# Patient Record
Sex: MALE | Race: WHITE | NOT HISPANIC OR LATINO
[De-identification: names, ages, dates, MRNs, and addresses within clinical notes are randomized per-mention and may not be internally consistent; named-entity substitution may affect disease eponyms.]

---

## 2017-03-06 ENCOUNTER — APPOINTMENT (OUTPATIENT)
Dept: DERMATOLOGY | Facility: CLINIC | Age: 47
End: 2017-03-06

## 2017-04-21 ENCOUNTER — EMERGENCY (EMERGENCY)
Facility: HOSPITAL | Age: 47
LOS: 1 days | Discharge: HOME | End: 2017-04-21
Attending: EMERGENCY MEDICINE | Admitting: EMERGENCY MEDICINE
Payer: MEDICAID

## 2017-04-21 VITALS
SYSTOLIC BLOOD PRESSURE: 162 MMHG | RESPIRATION RATE: 16 BRPM | HEART RATE: 74 BPM | DIASTOLIC BLOOD PRESSURE: 89 MMHG | OXYGEN SATURATION: 98 % | TEMPERATURE: 99 F

## 2017-04-21 PROCEDURE — 73130 X-RAY EXAM OF HAND: CPT | Mod: 26,RT

## 2017-04-21 PROCEDURE — 99284 EMERGENCY DEPT VISIT MOD MDM: CPT

## 2017-04-21 RX ORDER — IBUPROFEN 200 MG
600 TABLET ORAL ONCE
Qty: 0 | Refills: 0 | Status: COMPLETED | OUTPATIENT
Start: 2017-04-21 | End: 2017-04-21

## 2017-04-21 RX ADMIN — Medication 600 MILLIGRAM(S): at 21:22

## 2017-04-21 NOTE — ED PROVIDER NOTE - MEDICAL DECISION MAKING DETAILS
26 y/o M diabetic M w/ hematoma to R 2nd digit, questionable trauma, will check finger stick, obtain XR, pain control, reassess. 46 y/o diabetic M w/ hematoma to R 2nd digit, questionable trauma, will check finger stick, obtain XR, pain control, reassess. 48 y/o diabetic M w/ hematoma to R 2nd digit, questionable trauma, will check finger stick, obtain XR, pain control, reassess.  DC with abx given ?injury and mild surrounding erythema, concern for cellulitis/infection in diabetic.  Outpatinet pmd f/u 48hrs for wound check and hand follow-up.

## 2017-04-21 NOTE — ED PROVIDER NOTE - UPPER EXTREMITY EXAM, RIGHT
TENDERNESS/small x0.5cm diameter hematoma to the medial aspect of his distal R 2nd digit medial to the nail TTP, no jaesn deformity or TTP, pt has FROM at the DIP, PIP, and MCP, able to flex and extend, no other evidence of trauma to the hand TENDERNESS/small x0.5cm diameter hematoma/blood filled blister to the medial aspect of his distal R 2nd digit medial to the nail TTP, no jasen deformity or TTP, pt has FROM at the DIP, PIP, and MCP, able to flex and extend, no other evidence of trauma to the hand

## 2017-04-21 NOTE — ED PROVIDER NOTE - CARE PLAN
Principal Discharge DX:	Finger pain  Instructions for follow-up, activity and diet:	Take Clindamycin 300mg 4 times a day for 7 days. Take Probiotics to prevent GI upset.  up with your primary care physician for post hospital visit within 48 hours.  Follow up with hand specialist for further evaluation - list attached.  Return to the Emergency Department for any new, worsening or concerning symptoms.

## 2017-04-21 NOTE — ED PROVIDER NOTE - PROGRESS NOTE DETAILS
KERVIN Green: pt VANESA, VSS. Discussed the results of the imaging with the patient. Advised him to follow up with hand and PCP

## 2017-04-21 NOTE — ED PROVIDER NOTE - NS ED MD SCRIBE ATTENDING SCRIBE SECTIONS
VITAL SIGNS( Pullset)/HIV/HISTORY OF PRESENT ILLNESS/DISPOSITION/PAST MEDICAL/SURGICAL/SOCIAL HISTORY/PHYSICAL EXAM/REVIEW OF SYSTEMS

## 2017-04-21 NOTE — ED PROVIDER NOTE - OBJECTIVE STATEMENT
48 y/o M, w/ PMHx of DM Type 2(on Metformin), and HLD(on atorvastatin) presents to ED c/o worsening bump w/ pain to the R 2nd finger since this AM. States his finger was fine the night before however has h/o sleep walking. Endorses pain at the area of the bump. Notes difficulty bending his finger. Denies trauma, and other complaints. NKDA. 46 y/o M, w/ PMHx of DM Type 2(on Metformin), and HLD(on atorvastatin) presents to ED c/o worsening bump w/ pain to the R 2nd finger since this AM. States his finger was fine the night before however has h/o sleep walking and thinks he possibly injured it while sleeping. Endorses pain at the area of the bump, no other pain or trauma.  Denies fever, chills, rash, swelling, other complaints. NKDA.

## 2017-04-26 ENCOUNTER — LABORATORY RESULT (OUTPATIENT)
Age: 47
End: 2017-04-26

## 2017-04-26 ENCOUNTER — APPOINTMENT (OUTPATIENT)
Dept: DERMATOLOGY | Facility: CLINIC | Age: 47
End: 2017-04-26

## 2017-04-26 VITALS — DIASTOLIC BLOOD PRESSURE: 80 MMHG | HEIGHT: 72 IN | SYSTOLIC BLOOD PRESSURE: 128 MMHG

## 2017-04-26 DIAGNOSIS — Z86.39 PERSONAL HISTORY OF OTHER ENDOCRINE, NUTRITIONAL AND METABOLIC DISEASE: ICD-10-CM

## 2017-04-26 DIAGNOSIS — R23.8 OTHER SKIN CHANGES: ICD-10-CM

## 2017-04-26 DIAGNOSIS — Z78.9 OTHER SPECIFIED HEALTH STATUS: ICD-10-CM

## 2017-04-26 RX ORDER — MUPIROCIN 20 MG/G
2 OINTMENT TOPICAL
Qty: 1 | Refills: 1 | Status: ACTIVE | COMMUNITY
Start: 2017-04-26 | End: 1900-01-01

## 2017-04-27 ENCOUNTER — MEDICATION RENEWAL (OUTPATIENT)
Age: 47
End: 2017-04-27

## 2017-04-27 RX ORDER — DOXYCYCLINE HYCLATE 100 MG/1
100 CAPSULE ORAL
Qty: 1 | Refills: 0 | Status: DISCONTINUED | COMMUNITY
Start: 2017-04-26 | End: 2017-04-27

## 2017-04-27 RX ORDER — DOXYCYCLINE 100 MG/1
100 CAPSULE ORAL
Qty: 1 | Refills: 0 | Status: ACTIVE | COMMUNITY
Start: 2017-04-27 | End: 1900-01-01

## 2017-05-01 ENCOUNTER — MEDICATION RENEWAL (OUTPATIENT)
Age: 47
End: 2017-05-01

## 2017-05-01 DIAGNOSIS — A49.02 METHICILLIN RESISTANT STAPHYLOCOCCUS AUREUS INFECTION, UNSPECIFIED SITE: ICD-10-CM

## 2017-05-01 RX ORDER — CLINDAMYCIN HYDROCHLORIDE 300 MG/1
300 CAPSULE ORAL EVERY 6 HOURS
Qty: 40 | Refills: 0 | Status: ACTIVE | COMMUNITY
Start: 2017-05-01 | End: 1900-01-01

## 2017-05-08 ENCOUNTER — APPOINTMENT (OUTPATIENT)
Dept: DERMATOLOGY | Facility: CLINIC | Age: 47
End: 2017-05-08

## 2017-05-08 VITALS — DIASTOLIC BLOOD PRESSURE: 80 MMHG | SYSTOLIC BLOOD PRESSURE: 126 MMHG

## 2017-05-08 DIAGNOSIS — L03.90 CELLULITIS, UNSPECIFIED: ICD-10-CM

## 2017-05-08 DIAGNOSIS — L72.9 FOLLICULAR CYST OF THE SKIN AND SUBCUTANEOUS TISSUE, UNSPECIFIED: ICD-10-CM

## 2017-05-22 ENCOUNTER — APPOINTMENT (OUTPATIENT)
Dept: DERMATOLOGY | Facility: CLINIC | Age: 47
End: 2017-05-22

## 2018-07-25 ENCOUNTER — EMERGENCY (EMERGENCY)
Facility: HOSPITAL | Age: 48
LOS: 0 days | Discharge: HOME | End: 2018-07-26
Attending: EMERGENCY MEDICINE
Payer: MEDICAID

## 2018-07-25 VITALS
HEART RATE: 81 BPM | HEIGHT: 72 IN | WEIGHT: 235.01 LBS | RESPIRATION RATE: 16 BRPM | SYSTOLIC BLOOD PRESSURE: 155 MMHG | OXYGEN SATURATION: 97 % | TEMPERATURE: 99 F | DIASTOLIC BLOOD PRESSURE: 99 MMHG

## 2018-07-25 DIAGNOSIS — R07.81 PLEURODYNIA: ICD-10-CM

## 2018-07-25 DIAGNOSIS — E11.9 TYPE 2 DIABETES MELLITUS WITHOUT COMPLICATIONS: ICD-10-CM

## 2018-07-25 DIAGNOSIS — Z79.4 LONG TERM (CURRENT) USE OF INSULIN: ICD-10-CM

## 2018-07-25 DIAGNOSIS — E78.5 HYPERLIPIDEMIA, UNSPECIFIED: ICD-10-CM

## 2018-07-25 DIAGNOSIS — I10 ESSENTIAL (PRIMARY) HYPERTENSION: ICD-10-CM

## 2018-07-25 DIAGNOSIS — R52 PAIN, UNSPECIFIED: ICD-10-CM

## 2018-07-25 DIAGNOSIS — R58 HEMORRHAGE, NOT ELSEWHERE CLASSIFIED: ICD-10-CM

## 2018-07-25 DIAGNOSIS — F41.9 ANXIETY DISORDER, UNSPECIFIED: ICD-10-CM

## 2018-07-25 PROCEDURE — 99284 EMERGENCY DEPT VISIT MOD MDM: CPT

## 2018-07-25 NOTE — ED ADULT TRIAGE NOTE - CHIEF COMPLAINT QUOTE
Pt had a fall when visiting a store, he fell on his side and hit a chair. Pt has a noticeable bruise to the right underarm, pt has no SOB, but he does have pain. Pt has full ROM of the extremity, but states he has a little pain in it. Pt is also complaining of pain in his lower back. Denies chest pain, denies shortness of breath. Pt did not hit his head and has no LOC to report.

## 2018-07-25 NOTE — ED ADULT NURSE NOTE - CHPI ED SYMPTOMS NEG
no bladder dysfunction/no numbness/no bowel dysfunction/no fatigue/no constipation/no neck tenderness/no anorexia/no motor function loss/no tingling/no difficulty bearing weight

## 2018-07-25 NOTE — ED ADULT NURSE NOTE - CINV DISCH MEDS REVIEWED YN
Mildly to Moderately Impaired: difficulty hearing in some environments or speaker may need to increase volume or speak distinctly No

## 2018-07-26 VITALS — HEART RATE: 76 BPM | SYSTOLIC BLOOD PRESSURE: 160 MMHG | RESPIRATION RATE: 20 BRPM | DIASTOLIC BLOOD PRESSURE: 106 MMHG

## 2018-07-26 PROBLEM — E11.9 TYPE 2 DIABETES MELLITUS WITHOUT COMPLICATIONS: Chronic | Status: ACTIVE | Noted: 2017-04-21

## 2018-07-26 PROBLEM — E78.5 HYPERLIPIDEMIA, UNSPECIFIED: Chronic | Status: ACTIVE | Noted: 2017-04-21

## 2018-07-26 PROCEDURE — 71250 CT THORAX DX C-: CPT | Mod: 26

## 2018-07-26 PROCEDURE — 72100 X-RAY EXAM L-S SPINE 2/3 VWS: CPT | Mod: 26

## 2018-07-26 RX ORDER — METFORMIN HYDROCHLORIDE 850 MG/1
1 TABLET ORAL
Qty: 0 | Refills: 0 | COMMUNITY

## 2018-07-26 RX ORDER — IBUPROFEN 200 MG
1 TABLET ORAL
Qty: 20 | Refills: 0 | OUTPATIENT
Start: 2018-07-26 | End: 2018-07-30

## 2018-07-26 RX ORDER — ATORVASTATIN CALCIUM 80 MG/1
1 TABLET, FILM COATED ORAL
Qty: 0 | Refills: 0 | COMMUNITY

## 2018-07-26 RX ORDER — IBUPROFEN 200 MG
600 TABLET ORAL ONCE
Qty: 0 | Refills: 0 | Status: COMPLETED | OUTPATIENT
Start: 2018-07-26 | End: 2018-07-26

## 2018-07-26 RX ADMIN — Medication 600 MILLIGRAM(S): at 02:16

## 2018-07-26 RX ADMIN — Medication 600 MILLIGRAM(S): at 01:30

## 2018-07-26 NOTE — ED PROVIDER NOTE - MEDICAL DECISION MAKING DETAILS
Patient with slip and fall. VSS.  CT, xray findings negative for internal injuries, findings were discussed with patient.  Patient was examined head to toe and screened for additional injuries. Based on patient's history and physical exam, there are no apparent indications for further emergent labs, imaging, or additional diagnostics at this time. Discussed results and outcome of today's visit with the patient.  Patient advised to please follow up with another healthcare provider within the next 24 hours and return to the Emergency Department for worsening symptoms or any other concerns.  Patient advised that their doctor may call  to follow up on the specific results of the tests performed today in the emergency department.   Patient appears well on discharge.

## 2018-07-26 NOTE — ED PROVIDER NOTE - PHYSICAL EXAMINATION
Gen: Alert, NAD, well appearing  Head: NC, AT, PERRL, EOMI, normal lids/conjunctiva  ENT: normal hearing, patent oropharynx without erythema/exudate, uvula midline  Neck: +supple, no tenderness/meningismus/JVD, +Trachea midline  Pulm: Bilateral BS, normal resp effort, no wheeze/stridor/retractions, +right midaxillary rib cage tenderness and ecchymosis  CV: RRR, no M/R/G, +dist pulses  Abd: soft, NT/ND, +BS, no palpable masses  Mskel: no edema/erythema/cyanosis, +left scapular tenderness, no bony stepoffs, no midline lumbar back pain  Skin: no rash, warm/dry  Neuro: AAOx3, no apparent sensory/motor deficits, coordination intact

## 2018-07-26 NOTE — ED PROVIDER NOTE - OBJECTIVE STATEMENT
Pertinent PMH/PSH/FHx/SHx and Review of Systems contained within:  Patient presents to the ED for fall. Patient had a slip and fall at the grocery store 2 days ago, hit the right ribs against edge of chair and fell on the ground. Denies head impact, headache, or neck pain.  Complains of pain to the right mid rib cage, lower back, and left shoulder blade.  Denies any shortness of breath, abdominal pain, or use of anticoagulation.    ROS: No fever/chills, No headache/photophobia/eye pain/changes in vision, No ear pain/sore throat/dysphagia, No chest pain/palpitations, no SOB/cough/wheeze/stridor, No abdominal pain, No N/V/D/melena, no dysuria/frequency/discharge, No neck pain, no rash, no changes in neurological status/function.

## 2020-12-11 NOTE — ED PROVIDER NOTE - PLAN OF CARE
Unremarkable Take Clindamycin 300mg 4 times a day for 7 days. Take Probiotics to prevent GI upset.  up with your primary care physician for post hospital visit within 48 hours.  Follow up with hand specialist for further evaluation - list attached.  Return to the Emergency Department for any new, worsening or concerning symptoms.

## 2021-06-08 NOTE — ED ADULT TRIAGE NOTE - SOURCE OF INFORMATION
no lesions,  no deformities,  no traumatic injuries,  no significant scars are present,  chest wall non-tender,  no masses present, breathing is unlabored without accessory muscle use,normal breath sounds
Patient

## 2023-08-25 ENCOUNTER — NON-APPOINTMENT (OUTPATIENT)
Age: 53
End: 2023-08-25
